# Patient Record
Sex: MALE | Race: WHITE | NOT HISPANIC OR LATINO | Employment: OTHER | URBAN - NONMETROPOLITAN AREA
[De-identification: names, ages, dates, MRNs, and addresses within clinical notes are randomized per-mention and may not be internally consistent; named-entity substitution may affect disease eponyms.]

---

## 2017-06-21 ENCOUNTER — HOSPITAL ENCOUNTER (OUTPATIENT)
Dept: CT IMAGING | Facility: HOSPITAL | Age: 67
Discharge: HOME | End: 2017-06-21
Attending: PEDIATRICS

## 2017-06-21 ENCOUNTER — HOSPITAL ENCOUNTER (OUTPATIENT)
Dept: CT IMAGING | Facility: HOSPITAL | Age: 67
End: 2017-06-21
Attending: PEDIATRICS

## 2017-06-21 ENCOUNTER — HOSPITAL ENCOUNTER (OUTPATIENT)
Dept: CT IMAGING | Facility: HOSPITAL | Age: 67
Discharge: HOME | End: 2017-06-21
Attending: PEDIATRICS | Admitting: PEDIATRICS

## 2017-06-21 ENCOUNTER — TRANSCRIBE ORDERS (OUTPATIENT)
Dept: LAB | Facility: HOSPITAL | Age: 67
End: 2017-06-21

## 2017-06-21 DIAGNOSIS — Z85.9 HISTORY OF CANCER: Primary | ICD-10-CM

## 2017-06-21 DIAGNOSIS — Z85.9 HISTORY OF CANCER: ICD-10-CM

## 2017-06-21 PROCEDURE — 70490 CT SOFT TISSUE NECK W/O DYE: CPT

## 2017-06-21 PROCEDURE — 71250 CT THORAX DX C-: CPT

## 2017-06-21 PROCEDURE — 74176 CT ABD & PELVIS W/O CONTRAST: CPT

## 2017-06-21 PROCEDURE — 70450 CT HEAD/BRAIN W/O DYE: CPT

## 2017-06-22 ENCOUNTER — APPOINTMENT (OUTPATIENT)
Dept: CT IMAGING | Facility: HOSPITAL | Age: 67
End: 2017-06-22
Attending: PEDIATRICS

## 2017-06-22 ENCOUNTER — APPOINTMENT (OUTPATIENT)
Dept: GENERAL RADIOLOGY | Facility: HOSPITAL | Age: 67
End: 2017-06-22
Attending: PEDIATRICS

## 2018-09-11 ENCOUNTER — HOSPITAL ENCOUNTER (OUTPATIENT)
Dept: CT IMAGING | Facility: HOSPITAL | Age: 68
Discharge: HOME | End: 2018-09-11
Attending: PEDIATRICS | Admitting: PEDIATRICS

## 2018-09-11 ENCOUNTER — TRANSCRIBE ORDERS (OUTPATIENT)
Dept: ADMINISTRATIVE | Facility: HOSPITAL | Age: 68
End: 2018-09-11

## 2018-09-11 ENCOUNTER — LAB (OUTPATIENT)
Dept: LAB | Facility: HOSPITAL | Age: 68
End: 2018-09-11
Attending: PEDIATRICS

## 2018-09-11 DIAGNOSIS — E55.9 VITAMIN D DEFICIENCY: ICD-10-CM

## 2018-09-11 DIAGNOSIS — Z00.00 ROUTINE GENERAL MEDICAL EXAMINATION AT A HEALTH CARE FACILITY: ICD-10-CM

## 2018-09-11 DIAGNOSIS — Z00.00 ROUTINE GENERAL MEDICAL EXAMINATION AT A HEALTH CARE FACILITY: Primary | ICD-10-CM

## 2018-09-11 DIAGNOSIS — Z85.9 HISTORY OF MALIGNANT NEOPLASM: ICD-10-CM

## 2018-09-11 DIAGNOSIS — Z12.5 SPECIAL SCREENING FOR MALIGNANT NEOPLASM OF PROSTATE: ICD-10-CM

## 2018-09-11 LAB
25(OH)D3 SERPL-MCNC: 61.5 NG/ML (ref 30–100)
ALBUMIN SERPL-MCNC: 4.2 G/DL (ref 3.5–5)
ALBUMIN/GLOB SERPL: 1.4 G/DL (ref 1.1–2.5)
ALP SERPL-CCNC: 84 U/L (ref 24–120)
ALT SERPL W P-5'-P-CCNC: 28 U/L (ref 0–54)
ANION GAP SERPL CALCULATED.3IONS-SCNC: 6 MMOL/L (ref 4–13)
AST SERPL-CCNC: 25 U/L (ref 7–45)
AUTO MIXED CELLS #: 0.7 10*3/MM3 (ref 0.1–2.6)
AUTO MIXED CELLS %: 6.1 % (ref 0.1–24)
BILIRUB SERPL-MCNC: 0.6 MG/DL (ref 0.1–1)
BILIRUB UR QL STRIP: NEGATIVE
BUN BLD-MCNC: 8 MG/DL (ref 5–21)
BUN/CREAT SERPL: 8.9
CALCIUM SPEC-SCNC: 9.2 MG/DL (ref 8.4–10.4)
CHLORIDE SERPL-SCNC: 107 MMOL/L (ref 98–110)
CHOLEST SERPL-MCNC: 196 MG/DL (ref 130–200)
CLARITY UR: CLEAR
CO2 SERPL-SCNC: 31 MMOL/L (ref 24–31)
COLOR UR: YELLOW
CREAT BLD-MCNC: 0.9 MG/DL (ref 0.5–1.4)
ERYTHROCYTE [DISTWIDTH] IN BLOOD BY AUTOMATED COUNT: 13.2 % (ref 12–15)
GFR SERPL CREATININE-BSD FRML MDRD: 84 ML/MIN/1.73
GLOBULIN UR ELPH-MCNC: 3.1 GM/DL
GLUCOSE BLD-MCNC: 108 MG/DL (ref 70–100)
GLUCOSE UR STRIP-MCNC: NEGATIVE MG/DL
HCT VFR BLD AUTO: 45.3 % (ref 40–52)
HDLC SERPL-MCNC: 59 MG/DL
HGB BLD-MCNC: 15.2 G/DL (ref 14–18)
HGB UR QL STRIP.AUTO: NEGATIVE
KETONES UR QL STRIP: NEGATIVE
LDLC SERPL CALC-MCNC: 123 MG/DL (ref 0–99)
LDLC/HDLC SERPL: 2.09 {RATIO}
LEUKOCYTE ESTERASE UR QL STRIP.AUTO: NEGATIVE
LYMPHOCYTES # BLD AUTO: 3.2 10*3/MM3 (ref 0.8–7)
LYMPHOCYTES NFR BLD AUTO: 29.8 % (ref 15–45)
MCH RBC QN AUTO: 31.8 PG (ref 28–32)
MCHC RBC AUTO-ENTMCNC: 33.6 G/DL (ref 33–36)
MCV RBC AUTO: 94.8 FL (ref 82–95)
NEUTROPHILS # BLD AUTO: 6.8 10*3/MM3 (ref 1.5–8.3)
NEUTROPHILS NFR BLD AUTO: 64.1 % (ref 39–78)
NITRITE UR QL STRIP: NEGATIVE
PH UR STRIP.AUTO: 7.5 [PH] (ref 5–8)
PLATELET # BLD AUTO: 190 10*3/MM3 (ref 130–400)
PMV BLD AUTO: 9.7 FL (ref 6–12)
POTASSIUM BLD-SCNC: 4.7 MMOL/L (ref 3.5–5.3)
PROT SERPL-MCNC: 7.3 G/DL (ref 6.3–8.7)
PROT UR QL STRIP: NEGATIVE
PSA SERPL-MCNC: 1.52 NG/ML (ref 0–4)
RBC # BLD AUTO: 4.78 10*6/MM3 (ref 4.2–5.4)
SODIUM BLD-SCNC: 144 MMOL/L (ref 135–145)
SP GR UR STRIP: 1.01 (ref 1–1.03)
TRIGL SERPL-MCNC: 69 MG/DL (ref 0–149)
UROBILINOGEN UR QL STRIP: NORMAL
VLDLC SERPL-MCNC: 13.8 MG/DL
WBC NRBC COR # BLD: 10.7 10*3/MM3 (ref 4.8–10.8)

## 2018-09-11 PROCEDURE — 70450 CT HEAD/BRAIN W/O DYE: CPT

## 2018-09-11 PROCEDURE — 36415 COLL VENOUS BLD VENIPUNCTURE: CPT

## 2018-09-11 PROCEDURE — 81003 URINALYSIS AUTO W/O SCOPE: CPT

## 2018-09-11 PROCEDURE — 74176 CT ABD & PELVIS W/O CONTRAST: CPT

## 2018-09-11 PROCEDURE — 85025 COMPLETE CBC W/AUTO DIFF WBC: CPT | Performed by: PEDIATRICS

## 2018-09-11 PROCEDURE — 70490 CT SOFT TISSUE NECK W/O DYE: CPT

## 2018-09-11 PROCEDURE — 80061 LIPID PANEL: CPT

## 2018-09-11 PROCEDURE — G0103 PSA SCREENING: HCPCS | Performed by: PEDIATRICS

## 2018-09-11 PROCEDURE — 80053 COMPREHEN METABOLIC PANEL: CPT | Performed by: PEDIATRICS

## 2018-09-11 PROCEDURE — 71250 CT THORAX DX C-: CPT

## 2018-09-11 PROCEDURE — 82306 VITAMIN D 25 HYDROXY: CPT | Performed by: PEDIATRICS

## 2018-09-17 NOTE — PROGRESS NOTES
Hector Cyr MD     Chief Complaint   Patient presents with   • tongue mass       HPI   Julio Abdullahi is a  68 y.o.  male who presents for evaluation of an abnormal radiographic study. He had a recent CT  of the neck that showed base of tongue irregularity and shoddy adenopathy.[facility] scanning was obtained for evaluation of a history of melanoma.  He has had no complaints related to the findings. He denies neither otalgia nor throat pain, feeling of something in the throat, voice change or trouble swallowing.    Review of Systems:  Reviewed per patient intake note    Past History:  History reviewed. No pertinent past medical history.  History reviewed. No pertinent surgical history.  History reviewed. No pertinent family history.  Social History   Substance Use Topics   • Smoking status: Never Smoker   • Smokeless tobacco: Never Used   • Alcohol use No     Outpatient Prescriptions Marked as Taking for the 9/19/18 encounter (Office Visit) with Hector Cyr MD   Medication Sig Dispense Refill   • Multiple Vitamin (MULTI-VITAMIN DAILY PO) Take  by mouth.       Allergies:  Patient has no known allergies.    Vital Signs:   Temp:  [97.7 °F (36.5 °C)] 97.7 °F (36.5 °C)  BP: (132)/(88) 132/88    Physical Exam   CONSTITUTIONAL: well nourished, well-developed, alert, oriented, in no acute distress   COMMUNICATION AND VOICE: able to communicate normally, normal voice quality  HEAD: normocephalic, no lesions, atraumatic, no tenderness, no masses   FACE: appearance normal, no lesions, no tenderness, no deformities, facial motion symmetric  SALIVARY GLANDS: parotid glands with no tenderness, no swelling, no masses, submandibular glands with normal size, nontender  EYES: ocular motility normal, eyelids normal, orbits normal, no proptosis, conjunctiva normal , pupils equal, round  HEARING: response to conversational voice normal bilaterally    EXTERNAL EARS: auricles without lesions  EXTERNAL EAR CANALS: normal ear canals without stenosis or significant cerumen  TYMPANIC MEMBRANES: tympanic membrane appearance normal, no lesions, no perforation, normal mobility, no fluid  EXTERNAL NOSE: structure normal, no tenderness on palpation, no nasal discharge, no lesions, no evidence of trauma, nostrils patent  INTRANASAL EXAM: nasal mucosa normal, vestibule within normal limits, inferior turbinate normal,  nasal septum without overt anterior deviation  NASOPHARYNX: nasopharyngeal mucosa, adenoids within normal limits  LIPS: structure normal, no tenderness on palpation, no lesions, no evidence of trauma  TEETH: dentition within normal limits for age  GUMS: gingivae healthy  ORAL MUCOSA: oral mucosa normal, no mucosal lesions  FLOOR OF MOUTH: Warthin's duct patent, mucosa normal  TONGUE: mild to moderate tongue hypertrophy present, soft to palpation  PALATE: soft and hard palates with normal mucosa and structure  OROPHARYNX: oropharyngeal mucosa normal, tonsil fossa normal in appearance  HYPOPHARYNX: hypopharyngeal mucosa normal  LARYNX: epiglottis and arytenoid cartilage within normal limits, vocal cord mucosa normal with normal mobility   NECK: neck appearance normal, no masses or tenderness  THYROID: no overt thyromegaly, no tenderness, nodules or mass present on palpation, position midline   LYMPH NODES: no lymphadenopathy  CHEST/RESPIRATORY: respiratory effort normal, normal breath sounds  CARDIOVASCULAR: rate and rhythm normal, extremities without cyanosis or edema, no overt jugulovenous distension present  NEUROLOGIC/PSYCHIATRIC: oriented appropriately for age, mood normal, affect appropriate, cranial nerves intact grossly unless specifically mentioned above     RESULTS REVIEW:    I have personally reviewed the patient's ct scan of the neck without contrast images.           Ct Soft Tissue Neck Without Contrast    Result Date: 9/11/2018  Narrative:  EXAMINATION: CT soft tissues neck without contrast 9/11/2018  HISTORY: HISTORY of malignant neoplasm  DOSE: 225 mGycm. Automated exposure control was utilized to diminish patient radiation dose.  FINDINGS: Multiple contiguous axial images are obtained through the soft tissues of the neck at 2 mm intervals without intravenous contrast administration with reformatted images obtained in the sagittal and coronal projections from the original data set. Those portions of the brain which are visualized are normal in appearance.  The orbits are normal in appearance. There is mild mucoperiosteal thickening of the sphenoid sinus. No air-fluid levels are present.  The nasopharynx and parapharyngeal spaces are symmetric with no evidence of mass effect or mass lesion. Both parotid glands as well as the submandibular glands are normal in appearance. As noted on the previous examination there is a borderline enlarged 10 mm level 2 node in the proximal left jugular chain. Some other smaller level 2 and 3 nodes are present on the left. Some small level 2 and level 3 nodes are also noted within the right jugular chain none of which are enlarged by CT criteria.   At the level of the base of the tongue there is noted to be an abnormal lobular soft tissue density. This measures approximately 2.4 cm in transverse dimension by 1.3 cm in anterior to posterior dimension and is concerning for a potential neoplasm involving the base of the tongue. The epiglottis is also nodular in appearance to the left of midline and there is also some abnormal soft tissue nodularity within the inferior aspect of the left vallecula. If not previously evaluated follow-up with ENT consultation and direct visualization of this area is recommended. The piriform sinuses are symmetric.  At the level of the true cords there is asymmetry of the right vocal fold which is adducted. I do not see evidence of a discrete mass at the level of the true cords. Subglottic  airway is unremarkable.  The thyroid gland is homogeneous in density. No evidence of enlarged lymphadenopathy within the supraclavicular fossa.  The superior mediastinum is unremarkable. The lung apices are clear. No acute bony abnormalities are present.      Impression: 1.. There is a abnormal lobular mass at the base of the tongue with measurements as above. There is also some asymmetric nodular thickening involving the left half of the epiglottis as well as some asymmetry along the inferior margin of the left vallecula. ENT consultation with direct visualization of this area is recommended. A neoplasm involving the base of the tongue is not excluded. 2. There is adduction of the right vocal fold. A discrete mass is not identified at the level of the cords. 3. Borderline enlarged level 2 node on the left measuring 10 mm in short axis. Some other smaller shotty nodes are noted within the jugular chains bilaterally including level 2 and 3 nodes. This report was finalized on 09/11/2018 13:04 by Dr. Salvador Vazquez MD.    Ct Chest Without Contrast    Result Date: 9/11/2018  Narrative: CT CHEST without contrast dated 9/11/2018 10:26 AM CDT  HISTORY: HISTORY OF MALIGNANT NEOPLASM; Z85.9-Personal history of malignant neoplasm, unspecified  COMPARISON: 6/21/2017  DLP: 530 mGy cm. Automated exposure control was utilized to diminish patient radiation dose.  TECHNIQUE: Serial helical tomographic images of the chest were acquired. Bone and soft tissue algorithms were provided. Coronal reformatted images were also provided for review.  FINDINGS: The imaged portion of the neck and thyroid gland is unremarkable.  There is scarring within the lung apices. There is linear scarring within the right lower lobe and left basilar subsegmental atelectasis. No discrete lung lesion is demonstrated. No convincing evidence of metastatic disease to the lungs.. No pleural effusion is present. The trachea and bronchial tree are patent.  The  heart, great vessels, and pulmonary vessels are normal in appearance within limits of a noncontrast study. There is no pericardial effusion. No enlarged axillary adenopathy is present. There is a borderline-enlarged lateral AP window node measuring 9 mm in short axis but unchanged from the previous study. Several small shotty pretracheal, right paratracheal and AP window nodes are present none of which are enlarged by CT criteria and which are stable. Calcified subcarinal and left perihilar nodes are present related to old healed granulomatous disease..  The osseous structures of the thorax and surrounding soft tissues are unremarkable.  The imaged portion of the upper abdomen is unremarkable.      Impression: 1. Stable appearance of the chest from previous study of 6/21/2017. No radiographic evidence of metastatic disease. 2. Linear scarring within the right lung base.. 3. Stable lymph nodes within the middle mediastinum.   This report was finalized on 09/11/2018 13:16 by Dr. Salvador Vazquez MD.       Assessment   1. Abnormal CT scan, neck    2. Lingual tonsil hypertrophy        Plan    Conservative management.  Be aware of the signs and symptoms of head and neck cancer including neck mass, persistent sore throat, ear pain, hemoptysis, weight loss and hoarseness. If any of these symptoms occur, call for evaluation.     Return in about 6 months (around 3/19/2019).    Hector Cyr MD  09/19/18  9:14 AM

## 2018-09-19 ENCOUNTER — OFFICE VISIT (OUTPATIENT)
Dept: OTOLARYNGOLOGY | Facility: CLINIC | Age: 68
End: 2018-09-19

## 2018-09-19 VITALS
DIASTOLIC BLOOD PRESSURE: 88 MMHG | HEIGHT: 71 IN | SYSTOLIC BLOOD PRESSURE: 132 MMHG | BODY MASS INDEX: 21.76 KG/M2 | WEIGHT: 155.4 LBS | TEMPERATURE: 97.7 F

## 2018-09-19 DIAGNOSIS — R93.89 ABNORMAL CT SCAN, NECK: Primary | ICD-10-CM

## 2018-09-19 DIAGNOSIS — J35.1 LINGUAL TONSIL HYPERTROPHY: ICD-10-CM

## 2018-09-19 PROCEDURE — 99202 OFFICE O/P NEW SF 15 MIN: CPT | Performed by: OTOLARYNGOLOGY

## 2018-09-19 NOTE — PROGRESS NOTES
Sydnee Mccarty MA   Patient Intake Note    Review of Systems  Review of Systems   Constitutional: Negative for chills, fatigue and fever.   HENT:        See hpi   Respiratory: Negative for cough, choking, shortness of breath and wheezing.    Gastrointestinal: Negative for diarrhea, nausea and vomiting.   Allergic/Immunologic: Positive for environmental allergies. Negative for food allergies.   Neurological: Positive for dizziness (after allergy attack). Negative for light-headedness and headaches.   Hematological: Does not bruise/bleed easily.   Psychiatric/Behavioral: Negative for sleep disturbance.   All other systems reviewed and are negative.      QUALITY MEASURES    Body Mass Index Screening and Follow-Up Plan  Body mass index is 21.67 kg/m².  Patient's Body mass index is 21.67 kg/m². BMI is below normal parameters. Recommendations include: referral to primary care.    Tobacco Use: Screening and Cessation Intervention  Smoking status: Never Smoker                                                              Smokeless tobacco: Never Used                            Sydnee Mccarty MA  9/19/2018  8:57 AM

## 2019-05-31 ENCOUNTER — OFFICE VISIT (OUTPATIENT)
Dept: OTOLARYNGOLOGY | Facility: CLINIC | Age: 69
End: 2019-05-31

## 2019-05-31 ENCOUNTER — PROCEDURE VISIT (OUTPATIENT)
Dept: OTOLARYNGOLOGY | Facility: CLINIC | Age: 69
End: 2019-05-31

## 2019-05-31 VITALS
RESPIRATION RATE: 20 BRPM | HEART RATE: 65 BPM | BODY MASS INDEX: 21.84 KG/M2 | TEMPERATURE: 98 F | DIASTOLIC BLOOD PRESSURE: 79 MMHG | SYSTOLIC BLOOD PRESSURE: 135 MMHG | WEIGHT: 156 LBS | HEIGHT: 71 IN

## 2019-05-31 DIAGNOSIS — H91.90 HEARING LOSS, UNSPECIFIED HEARING LOSS TYPE, UNSPECIFIED LATERALITY: Primary | ICD-10-CM

## 2019-05-31 DIAGNOSIS — H90.5 SENSORINEURAL HEARING LOSS (SNHL) OF RIGHT EAR, UNSPECIFIED HEARING STATUS ON CONTRALATERAL SIDE: ICD-10-CM

## 2019-05-31 DIAGNOSIS — H90.5 SENSORINEURAL HEARING LOSS (SNHL) OF RIGHT EAR, UNSPECIFIED HEARING STATUS ON CONTRALATERAL SIDE: Primary | ICD-10-CM

## 2019-05-31 DIAGNOSIS — H91.20 SUDDEN HEARING LOSS, UNSPECIFIED LATERALITY: ICD-10-CM

## 2019-05-31 DIAGNOSIS — Z01.818 ENCOUNTER FOR OTHER PREPROCEDURAL EXAMINATION: ICD-10-CM

## 2019-05-31 PROCEDURE — 99213 OFFICE O/P EST LOW 20 MIN: CPT | Performed by: OTOLARYNGOLOGY

## 2019-05-31 PROCEDURE — 92570 ACOUSTIC IMMITANCE TESTING: CPT | Performed by: AUDIOLOGIST-HEARING AID FITTER

## 2019-05-31 PROCEDURE — 92557 COMPREHENSIVE HEARING TEST: CPT | Performed by: AUDIOLOGIST-HEARING AID FITTER

## 2019-05-31 RX ORDER — PREDNISONE 10 MG/1
TABLET ORAL
Qty: 42 TABLET | Refills: 0 | Status: SHIPPED | OUTPATIENT
Start: 2019-05-31 | End: 2019-07-26

## 2019-05-31 RX ORDER — BUTALBITAL, ASPIRIN, AND CAFFEINE 325; 50; 40 MG/1; MG/1; MG/1
CAPSULE ORAL
Refills: 0 | COMMUNITY
Start: 2019-05-13

## 2019-05-31 RX ORDER — DIAZEPAM 10 MG/1
TABLET ORAL
Refills: 0 | COMMUNITY
Start: 2019-05-13 | End: 2019-06-12 | Stop reason: SDUPTHER

## 2019-05-31 RX ORDER — TRIAMTERENE AND HYDROCHLOROTHIAZIDE 37.5; 25 MG/1; MG/1
1 CAPSULE ORAL EVERY MORNING
Qty: 30 CAPSULE | Refills: 3 | Status: SHIPPED | OUTPATIENT
Start: 2019-05-31 | End: 2019-06-30

## 2019-05-31 NOTE — PROGRESS NOTES
Hector Cyr MD     Chief Complaint   Patient presents with   • Follow-up     Hearing Loss. patient having Right ear fullness and hearing loss.        History of Present Illness  Julio Abdullahi is a  69 y.o. male who is here for follow up. He has had a recent flair up of ear fullness, hearing loss and roaring tinnitus. The symptoms are localized to the right side. The symptoms severity was described as: moderate The symptoms have been: relatively constant for the last several days There have been no identified factors that aggravate the symptoms. There have been no factors that have improved the symptoms. He denies  Vertigo but has had some light vertigo symptoms in the past. It usually is improved with a cocktail of dizzy medications from Dr Lucia. He was given a steroid shot and a medrol dose pack at the [facility] yesterday.    Review of Systems  Reviewed per patient intake note    Past History:  Past medical and surgical history, family history and social history reviewed and updated when appropriate.  Current medications and allergies reviewed and updated when appropriate.  Allergies:  Patient has no known allergies.        Vital Signs:   Temp:  [98 °F (36.7 °C)] 98 °F (36.7 °C)  Heart Rate:  [65] 65  Resp:  [20] 20  BP: (135)/(79) 135/79    Physical Exam:  CONSTITUTIONAL: well nourished, well-developed, alert, oriented, in no acute distress   COMMUNICATION AND VOICE: able to communicate normally, normal voice quality  HEAD: normocephalic, no lesions, atraumatic, no tenderness, no masses   FACE: appearance normal, no lesions, no tenderness, no deformities, facial motion symmetric  EYES: ocular motility normal, eyelids normal, orbits normal, no proptosis, conjunctiva normal , pupils equal, round  HEARING: response to conversational voice normal bilaterally   EXTERNAL EARS: auricles without lesions  EXTERNAL EAR CANALS: normal ear canals without stenosis or significant cerumen  TYMPANIC MEMBRANES:  tympanic membrane appearance normal, no lesions, no perforation, normal mobility, no fluid  EXTERNAL NOSE: structure normal, no tenderness on palpation, no nasal discharge, no lesions, no evidence of trauma, nostrils patent  LIPS: structure normal, no tenderness on palpation, no lesions, no evidence of trauma  NECK: neck appearance normal  LYMPH NODES: no lymphadenopathy  CHEST/RESPIRATORY: respiratory effort normal  CARDIOVASCULAR: extremities without cyanosis or edema, no overt jugulovenous distension present  NEUROLOGIC/PSYCHIATRIC: oriented appropriately for age, mood normal, affect appropriate, cranial nerves intact grossly unless specifically mentioned above     RESULTS REVIEW:    Pure tone testing showed moderate right low frequency up sloping sensorineural hearing loss     Assessment   1. Hearing loss, unspecified hearing loss type, unspecified laterality    2. Sudden hearing loss, unspecified laterality    3. Encounter for other preprocedural examination     4. Sensorineural hearing loss (SNHL) of right ear, unspecified hearing status on contralateral side        Plan    Medical and surgical options were discussed including oral steroids and steroid perfusion. Risks, benefits and alternatives were discussed and questions were answered. After considering the options, the patient decided to proceed with a higher oral steroid course with injection if not improving..  New Medications Ordered This Visit   Medications   • predniSONE (DELTASONE) 10 MG tablet     Si tabs daily x 2 days, 5 tabs daily x 2 days, 4 tabs x 2 days, 3 tablets x 2 days, 2 tablets x 2 days, and 1 tab x 2 days.     Dispense:  42 tablet     Refill:  0   • triamterene-hydrochlorothiazide (DYAZIDE) 37.5-25 MG per capsule     Sig: Take 1 capsule by mouth Every Morning for 30 days.     Dispense:  30 capsule     Refill:  3          Return in about 2 weeks (around 2019). If not improving, will consider transtympanic steroid  perfusion.    Hector Cyr MD  05/31/19  3:30 PM

## 2019-06-12 RX ORDER — MECLIZINE HYDROCHLORIDE 25 MG/1
25 TABLET ORAL 3 TIMES DAILY PRN
Qty: 60 TABLET | Refills: 3 | Status: SHIPPED | OUTPATIENT
Start: 2019-06-12 | End: 2019-07-12

## 2019-06-12 RX ORDER — DIAZEPAM 10 MG/1
10 TABLET ORAL DAILY PRN
Qty: 30 TABLET | Refills: 0 | Status: SHIPPED | OUTPATIENT
Start: 2019-06-12 | End: 2019-07-12

## 2019-06-20 NOTE — PROGRESS NOTES
Hector Cyr MD     Chief Complaint   Patient presents with   • Hearing Loss        History of Present Illness  Julio Abdullahi is a  69 y.o. male who is here for follow up. He was last seen in the office on 6/12/2019 by: myself with: A sudden right-sided sensorineural hearing loss.. The patient was treated with: steroids and Dyazide.  He has had some improvement but still with right sided decreased hearing. He desires steroid perfusion.    Review of Systems  Reviewed per patient intake note    Past History:  Past medical and surgical history, family history and social history reviewed and updated when appropriate.  Current medications and allergies reviewed and updated when appropriate.  Allergies:  Patient has no known allergies.        Vital Signs:   Temp:  [97.4 °F (36.3 °C)] 97.4 °F (36.3 °C)  Heart Rate:  [72] 72  Resp:  [16] 16  BP: (124)/(72) 124/72    Physical Exam   CONSTITUTIONAL: well nourished, well-developed, alert, oriented, in no acute distress   COMMUNICATION AND VOICE: able to communicate normally, normal voice quality  HEAD: normocephalic, no lesions, atraumatic, no tenderness, no masses   FACE: appearance normal, no lesions, no tenderness, no deformities, facial motion symmetric  EYES: ocular motility normal, eyelids normal, orbits normal, no proptosis, conjunctiva normal , pupils equal, round  HEARING: response to conversational voice normal bilaterally   EXTERNAL EARS: auricles without lesions  EXTERNAL EAR CANALS: normal ear canals without stenosis or significant cerumen  TYMPANIC MEMBRANES: tympanic membrane appearance normal, no lesions, no perforation, normal mobility, no fluid  EXTERNAL NOSE: structure normal, no tenderness on palpation, no nasal discharge, no lesions, no evidence of trauma, nostrils patent  LIPS: structure normal, no tenderness on palpation, no lesions, no evidence of trauma  NECK: neck appearance normal  LYMPH NODES: no lymphadenopathy  CHEST/RESPIRATORY:  respiratory effort normal  CARDIOVASCULAR: extremities without cyanosis or edema, no overt jugulovenous distension present  NEUROLOGIC/PSYCHIATRIC: oriented appropriately for age, mood normal, affect appropriate, cranial nerves intact grossly unless specifically mentioned above     RESULTS REVIEW:    Audiologic testing reviewed.  New audiogram deferred by patient till after perfusion     Assessment   1. Sensorineural hearing loss (SNHL) of right ear, unspecified hearing status on contralateral side        Plan    Medical and surgical options were discussed including observation and medication modification and steroid perfusion. Risks, benefits and alternatives were discussed and questions were answered. After considering the options, the patient decided to proceed with steroid perfusion.             Procedure Note    Pre-operative Diagnosis:   1. Sensorineural hearing loss (SNHL) of right ear, unspecified hearing status on contralateral side        Post-operative Diagnosis: same    Anesthesia: topical phenol    Procedure:   right dexamethasone perfusion of the middle ear space    Procedure Details:    After informed consent, the patient was placed on the procedure table in a supine position. Using the operative microscope and a speculum the external auditory canal was visualized and cleaned of its cerumen.The tympanic membrane was visualized. The anterior mid tympanic membrane was anesthetized with a drop of phenol. Then a 25 gauge gauge spinal needle was used to inject approximately 1 cc of dexamethasone, previously compounded by pharmacy using the [place] protocol. The patient was placed in a position with the injected ear up to layer the steroid onto the round window for 15-20 minutes.     Findings: normal eardrum and middle ear    Condition:  Stable.  Patient tolerated procedure well.    Complications:  None    Hector Cyr MD  6/21/2019  11:05 AM  Return in about 10 days (around 7/1/2019) for for  repeat steroid perfusion in 10-14 days.    Hector Cyr MD  06/21/19  11:05 AM

## 2019-06-21 ENCOUNTER — OFFICE VISIT (OUTPATIENT)
Dept: OTOLARYNGOLOGY | Facility: CLINIC | Age: 69
End: 2019-06-21

## 2019-06-21 VITALS
RESPIRATION RATE: 16 BRPM | HEART RATE: 72 BPM | WEIGHT: 160 LBS | DIASTOLIC BLOOD PRESSURE: 72 MMHG | TEMPERATURE: 97.4 F | HEIGHT: 71 IN | SYSTOLIC BLOOD PRESSURE: 124 MMHG | BODY MASS INDEX: 22.4 KG/M2

## 2019-06-21 DIAGNOSIS — H91.20 SUDDEN HEARING LOSS, UNSPECIFIED LATERALITY: ICD-10-CM

## 2019-06-21 DIAGNOSIS — H90.5 SENSORINEURAL HEARING LOSS (SNHL) OF RIGHT EAR, UNSPECIFIED HEARING STATUS ON CONTRALATERAL SIDE: Primary | ICD-10-CM

## 2019-06-21 PROCEDURE — 69801 INCISE INNER EAR: CPT | Performed by: OTOLARYNGOLOGY

## 2019-06-21 PROCEDURE — 99213 OFFICE O/P EST LOW 20 MIN: CPT | Performed by: OTOLARYNGOLOGY

## 2019-06-21 RX ORDER — ASPIRIN 81 MG/1
81 TABLET, CHEWABLE ORAL DAILY
COMMUNITY

## 2019-06-21 NOTE — PROGRESS NOTES
Temi Shaw RN   Patient Intake Note    Review of Systems  Review of Systems   Constitutional: Negative.    HENT: Positive for hearing loss and tinnitus.    Eyes: Negative.    Respiratory: Negative.    Cardiovascular: Negative.    Gastrointestinal: Negative.    Endocrine: Negative.    Genitourinary: Negative.    Musculoskeletal: Negative.    Skin: Negative.    Allergic/Immunologic: Positive for environmental allergies.   Neurological: Negative.    Hematological: Negative.    Psychiatric/Behavioral: Negative.        QUALITY MEASURES    Tobacco Use: Screening and Cessation Intervention  Social History    Tobacco Use      Smoking status: Former Smoker        Types: Cigarettes      Smokeless tobacco: Never Used        Temi Shaw RN  6/21/2019  10:14 AM

## 2019-07-01 ENCOUNTER — OFFICE VISIT (OUTPATIENT)
Dept: OTOLARYNGOLOGY | Facility: CLINIC | Age: 69
End: 2019-07-01

## 2019-07-01 DIAGNOSIS — H91.21 SUDDEN RIGHT HEARING LOSS: Primary | ICD-10-CM

## 2019-07-01 PROCEDURE — 69801 INCISE INNER EAR: CPT | Performed by: OTOLARYNGOLOGY

## 2019-07-08 ENCOUNTER — HOSPITAL ENCOUNTER (OUTPATIENT)
Dept: MRI IMAGING | Facility: HOSPITAL | Age: 69
Discharge: HOME | End: 2019-07-08
Admitting: OTOLARYNGOLOGY

## 2019-07-08 DIAGNOSIS — H91.20 SUDDEN HEARING LOSS, UNSPECIFIED LATERALITY: ICD-10-CM

## 2019-07-08 DIAGNOSIS — H90.5 SENSORINEURAL HEARING LOSS (SNHL) OF RIGHT EAR, UNSPECIFIED HEARING STATUS ON CONTRALATERAL SIDE: ICD-10-CM

## 2019-07-08 LAB — CREAT BLDA-MCNC: 1 MG/DL (ref 0.6–1.3)

## 2019-07-08 PROCEDURE — 0 GADOBENATE DIMEGLUMINE 529 MG/ML SOLUTION: Performed by: OTOLARYNGOLOGY

## 2019-07-08 PROCEDURE — 70553 MRI BRAIN STEM W/O & W/DYE: CPT

## 2019-07-08 PROCEDURE — 82565 ASSAY OF CREATININE: CPT

## 2019-07-08 PROCEDURE — A9577 INJ MULTIHANCE: HCPCS | Performed by: OTOLARYNGOLOGY

## 2019-07-08 RX ADMIN — GADOBENATE DIMEGLUMINE 15 ML: 529 INJECTION, SOLUTION INTRAVENOUS at 10:12

## 2019-07-24 NOTE — PROGRESS NOTES
Hector Cyr MD     Chief Complaint   Patient presents with   • Hearing Loss     improved   • Med Refill     Needs refills of Valium, Diazide and Antivert        History of Present Illness  Julio Abdullahi is a  69 y.o. male who is here for follow up.  He reports he has had improvement of his hearing on the right side but still has decreased hearing from prior to his event.  He has had some difficulty with imbalance since the acute drop.  He is using Dyazide and as needed Valium and Antivert.  He has been trying to keep a low-salt diet.    Review of Systems  Reviewed per patient intake note    Past History:  Past medical and surgical history, family history and social history reviewed and updated when appropriate.  Current medications and allergies reviewed and updated when appropriate.  Allergies:  Patient has no known allergies.        Vital Signs:   Temp:  [98.7 °F (37.1 °C)] 98.7 °F (37.1 °C)  Heart Rate:  [59] 59  BP: (120)/(70) 120/70    Physical Exam:  CONSTITUTIONAL: well nourished, well-developed, alert, oriented, in no acute distress   COMMUNICATION AND VOICE: able to communicate normally, normal voice quality  HEAD: normocephalic, no lesions, atraumatic, no tenderness, no masses   FACE: appearance normal, no lesions, no tenderness, no deformities, facial motion symmetric  EYES: ocular motility normal, eyelids normal, orbits normal, no proptosis, conjunctiva normal , pupils equal, round  HEARING: response to conversational voice normal bilaterally   EXTERNAL EARS: auricles without lesions  EXTERNAL EAR CANALS: normal ear canals without stenosis or significant cerumen  TYMPANIC MEMBRANES: tympanic membrane appearance normal, no lesions, no perforation, normal mobility, no fluid  EXTERNAL NOSE: structure normal, no tenderness on palpation, no nasal discharge, no lesions, no evidence of trauma, nostrils patent  LIPS: structure normal, no tenderness on palpation, no lesions, no evidence of  trauma  NECK: neck appearance normal  LYMPH NODES: no lymphadenopathy  CHEST/RESPIRATORY: respiratory effort normal  CARDIOVASCULAR: extremities without cyanosis or edema, no overt jugulovenous distension present  NEUROLOGIC/PSYCHIATRIC: oriented appropriately for age, mood normal, affect appropriate, cranial nerves intact grossly unless specifically mentioned above     RESULTS REVIEW:    His MRI showed a parafalcine meningioma.  There was no internal auditory canal mass.     Assessment   1. Sudden right hearing loss    2. Endolymphatic hydrops of right ear        Plan    Continue current management plan.  Follow a low salt diet to try to prevent inner ear fluid accumulation.  Follow the instructions for [place] exersises.  New Medications Ordered This Visit   Medications   • glycopyrrolate (ROBINUL) 1 MG tablet     Sig: Take 1 tablet by mouth 3 (Three) Times a Day As Needed (vertigo) for up to 30 days. Take one pill three times a day as needed for vertigo     Dispense:  60 tablet     Refill:  3          Return in about 4 months (around 11/26/2019).    Hector Cyr MD  07/26/19  11:01 AM

## 2019-07-26 ENCOUNTER — PROCEDURE VISIT (OUTPATIENT)
Dept: OTOLARYNGOLOGY | Facility: CLINIC | Age: 69
End: 2019-07-26

## 2019-07-26 ENCOUNTER — OFFICE VISIT (OUTPATIENT)
Dept: OTOLARYNGOLOGY | Facility: CLINIC | Age: 69
End: 2019-07-26

## 2019-07-26 VITALS
WEIGHT: 160 LBS | BODY MASS INDEX: 22.9 KG/M2 | HEIGHT: 70 IN | TEMPERATURE: 98.7 F | SYSTOLIC BLOOD PRESSURE: 120 MMHG | HEART RATE: 59 BPM | DIASTOLIC BLOOD PRESSURE: 70 MMHG

## 2019-07-26 DIAGNOSIS — H90.3 SNHL (SENSORY-NEURAL HEARING LOSS), ASYMMETRICAL: Primary | ICD-10-CM

## 2019-07-26 DIAGNOSIS — H81.01 ENDOLYMPHATIC HYDROPS OF RIGHT EAR: ICD-10-CM

## 2019-07-26 DIAGNOSIS — H91.21 SUDDEN RIGHT HEARING LOSS: Primary | ICD-10-CM

## 2019-07-26 PROCEDURE — 99213 OFFICE O/P EST LOW 20 MIN: CPT | Performed by: OTOLARYNGOLOGY

## 2019-07-26 RX ORDER — MECLIZINE HYDROCHLORIDE 25 MG/1
25 TABLET ORAL 3 TIMES DAILY PRN
COMMUNITY
End: 2019-07-29 | Stop reason: SDUPTHER

## 2019-07-26 RX ORDER — TRIAMTERENE AND HYDROCHLOROTHIAZIDE 37.5; 25 MG/1; MG/1
TABLET ORAL
Refills: 3 | COMMUNITY
Start: 2019-05-31 | End: 2019-07-29 | Stop reason: SDUPTHER

## 2019-07-26 RX ORDER — GLYCOPYRROLATE 1 MG/1
1 TABLET ORAL 3 TIMES DAILY PRN
Qty: 60 TABLET | Refills: 3 | Status: SHIPPED | OUTPATIENT
Start: 2019-07-26 | End: 2019-08-25

## 2019-07-26 RX ORDER — DIAZEPAM 10 MG/1
10 TABLET ORAL 2 TIMES DAILY PRN
COMMUNITY
End: 2019-07-29 | Stop reason: SDUPTHER

## 2019-07-26 NOTE — PROGRESS NOTES
Jodi Bello RN   Patient Intake Note    Review of Systems  Review of Systems   Constitutional: Negative for chills and fever.   HENT:        See HPI   Respiratory: Negative for cough and choking.    Gastrointestinal: Negative for diarrhea, nausea and vomiting.   Musculoskeletal: Negative for gait problem, neck pain and neck stiffness.   Neurological: Positive for dizziness. Negative for light-headedness and headaches.   Hematological: Does not bruise/bleed easily.   Psychiatric/Behavioral: Negative for sleep disturbance.   All other systems reviewed and are negative.      QUALITY MEASURES    Tobacco Use: Screening and Cessation Intervention  Social History    Tobacco Use      Smoking status: Former Smoker        Types: Cigarettes      Smokeless tobacco: Never Used        Jodi Bello RN  7/26/2019  10:12 AM

## 2019-07-26 NOTE — PATIENT INSTRUCTIONS
[facility] Exercises    Exercises for the management of vertigo  These exercises are designed to stimulate or “work” the vestibular system and eventually lessen vertigo during daily activities.  Each exercise is to be done at least twice a day beginning with 5 repetitions each and increasing to 10 repetitions, if you are able.    A.  Head and eye movements while sittin. Keeping head still, look up and then down.  2. Keeping head still, look side to side.  3. Hold a finger out at arm’s length.  Focus on your finger and bring it toward your nose, then back again.  4. Move your head slowly side to side with your eyes open.  5. Move your head quickly side to side.  6. Move your head slowly up and down with your eyes closed.  7. Move your head quickly up and down.  8. Repeat numbers 4 through 7 with your eyes closed.    B.  Head and body movements while sittin. Place an object on the floor in front of you.  Reach down to pick it up, then return to an upright position.  Remember to look down at the object, then look back up when you bring your trunk back up.  2. Bend forward and pass the object back and forth under your knees.    C.  1.   Go from a sitting to standing position, then return to sitting.  2. Repeat this with eyes closed.  3.   Repeat number 1, but turn a full [place] while standing before sitting down.    Other Activities to improve balance:  1. Walk up and down stairs carefully with your eyes open, then closed.  Hold onto a handrail for safety, if needed.  2. While standing, practice making sudden 90 degree turns first with the eyes open, then closed.  3. While walking, look side to side.  This is best done in a grocery store;  read labels as you walk down the isle.  4. Practice standing on one foot; first with the eyes open, then closed.  5. Standing on a soft surface, such as a egg crate mattress, pillow or foam mat:  a. First walk across the surface to get used to it.  b. If you have  room walk heel to toe with the eyes open, then closed.  c. Practice standing on one foot with the eyes open, then closed.

## 2019-07-29 RX ORDER — TRIAMTERENE AND HYDROCHLOROTHIAZIDE 37.5; 25 MG/1; MG/1
1 TABLET ORAL DAILY
Qty: 30 TABLET | Refills: 3 | Status: SHIPPED | OUTPATIENT
Start: 2019-07-29 | End: 2019-08-28

## 2019-07-29 RX ORDER — DIAZEPAM 10 MG/1
10 TABLET ORAL 2 TIMES DAILY PRN
Qty: 60 TABLET | Refills: 0 | Status: SHIPPED | OUTPATIENT
Start: 2019-07-29 | End: 2019-08-28

## 2019-07-29 RX ORDER — MECLIZINE HYDROCHLORIDE 25 MG/1
25 TABLET ORAL 3 TIMES DAILY PRN
Qty: 90 TABLET | Refills: 3 | Status: SHIPPED | OUTPATIENT
Start: 2019-07-29 | End: 2019-08-28

## 2020-08-26 ENCOUNTER — LAB (OUTPATIENT)
Dept: LAB | Facility: HOSPITAL | Age: 70
End: 2020-08-26

## 2020-08-26 ENCOUNTER — TRANSCRIBE ORDERS (OUTPATIENT)
Dept: ADMINISTRATIVE | Facility: HOSPITAL | Age: 70
End: 2020-08-26

## 2020-08-26 DIAGNOSIS — E55.9 VITAMIN D DEFICIENCY: ICD-10-CM

## 2020-08-26 DIAGNOSIS — Z12.5 ENCOUNTER FOR SCREENING FOR MALIGNANT NEOPLASM OF PROSTATE: ICD-10-CM

## 2020-08-26 DIAGNOSIS — Z00.00 ENCOUNTER FOR GENERAL ADULT MEDICAL EXAMINATION W/O ABNORMAL FINDINGS: ICD-10-CM

## 2020-08-26 DIAGNOSIS — Z00.00 ENCOUNTER FOR GENERAL ADULT MEDICAL EXAMINATION W/O ABNORMAL FINDINGS: Primary | ICD-10-CM

## 2020-08-26 LAB
ALBUMIN SERPL-MCNC: 4 G/DL (ref 3.5–5)
ALBUMIN/GLOB SERPL: 1.2 G/DL (ref 1.1–2.5)
ALP SERPL-CCNC: 100 U/L (ref 24–120)
ALT SERPL W P-5'-P-CCNC: 22 U/L (ref 0–50)
ANION GAP SERPL CALCULATED.3IONS-SCNC: 5 MMOL/L (ref 4–13)
AST SERPL-CCNC: 28 U/L (ref 7–45)
AUTO MIXED CELLS #: 0.5 10*3/MM3 (ref 0.1–2.6)
AUTO MIXED CELLS %: 6.3 % (ref 0.1–24)
BILIRUB SERPL-MCNC: 0.7 MG/DL (ref 0.1–1)
BILIRUB UR QL STRIP: NEGATIVE
BUN SERPL-MCNC: 11 MG/DL (ref 5–21)
BUN/CREAT SERPL: 10.7
CALCIUM SPEC-SCNC: 9.3 MG/DL (ref 8.4–10.4)
CHLORIDE SERPL-SCNC: 107 MMOL/L (ref 98–110)
CHOLEST SERPL-MCNC: 243 MG/DL (ref 130–200)
CLARITY UR: CLEAR
CO2 SERPL-SCNC: 29 MMOL/L (ref 24–31)
COLOR UR: YELLOW
CREAT SERPL-MCNC: 1.03 MG/DL (ref 0.5–1.4)
ERYTHROCYTE [DISTWIDTH] IN BLOOD BY AUTOMATED COUNT: 12.9 % (ref 12.3–15.4)
GFR SERPL CREATININE-BSD FRML MDRD: 72 ML/MIN/1.73
GLOBULIN UR ELPH-MCNC: 3.4 GM/DL
GLUCOSE SERPL-MCNC: 105 MG/DL (ref 70–100)
GLUCOSE UR STRIP-MCNC: NEGATIVE MG/DL
HCT VFR BLD AUTO: 46.5 % (ref 37.5–51)
HDLC SERPL-MCNC: 60 MG/DL
HGB BLD-MCNC: 15.6 G/DL (ref 13–17.7)
HGB UR QL STRIP.AUTO: NEGATIVE
KETONES UR QL STRIP: NEGATIVE
LDLC SERPL CALC-MCNC: 159 MG/DL (ref 0–99)
LDLC/HDLC SERPL: 2.65 {RATIO}
LEUKOCYTE ESTERASE UR QL STRIP.AUTO: NEGATIVE
LYMPHOCYTES # BLD AUTO: 3 10*3/MM3 (ref 0.7–3.1)
LYMPHOCYTES NFR BLD AUTO: 34.4 % (ref 19.6–45.3)
MCH RBC QN AUTO: 32 PG (ref 26.6–33)
MCHC RBC AUTO-ENTMCNC: 33.5 G/DL (ref 31.5–35.7)
MCV RBC AUTO: 95.3 FL (ref 79–97)
NEUTROPHILS NFR BLD AUTO: 5.2 10*3/MM3 (ref 1.7–7)
NEUTROPHILS NFR BLD AUTO: 59.3 % (ref 42.7–76)
NITRITE UR QL STRIP: NEGATIVE
PH UR STRIP.AUTO: 7 [PH] (ref 5–8)
PLATELET # BLD AUTO: 171 10*3/MM3 (ref 140–450)
PMV BLD AUTO: 9.6 FL (ref 6–12)
POTASSIUM SERPL-SCNC: 4.9 MMOL/L (ref 3.5–5.3)
PROT SERPL-MCNC: 7.4 G/DL (ref 6.3–8.7)
PROT UR QL STRIP: NEGATIVE
RBC # BLD AUTO: 4.88 10*6/MM3 (ref 4.14–5.8)
SODIUM SERPL-SCNC: 141 MMOL/L (ref 135–145)
SP GR UR STRIP: 1.01 (ref 1–1.03)
TRIGL SERPL-MCNC: 119 MG/DL (ref 0–149)
UROBILINOGEN UR QL STRIP: NORMAL
VLDLC SERPL-MCNC: 23.8 MG/DL
WBC # BLD AUTO: 8.7 10*3/MM3 (ref 3.4–10.8)

## 2020-08-26 PROCEDURE — 82306 VITAMIN D 25 HYDROXY: CPT | Performed by: PEDIATRICS

## 2020-08-26 PROCEDURE — 80061 LIPID PANEL: CPT

## 2020-08-26 PROCEDURE — 81003 URINALYSIS AUTO W/O SCOPE: CPT

## 2020-08-26 PROCEDURE — 80053 COMPREHEN METABOLIC PANEL: CPT | Performed by: PEDIATRICS

## 2020-08-26 PROCEDURE — 84443 ASSAY THYROID STIM HORMONE: CPT | Performed by: PEDIATRICS

## 2020-08-26 PROCEDURE — 85025 COMPLETE CBC W/AUTO DIFF WBC: CPT

## 2020-08-26 PROCEDURE — G0103 PSA SCREENING: HCPCS | Performed by: PEDIATRICS

## 2020-08-26 PROCEDURE — 36415 COLL VENOUS BLD VENIPUNCTURE: CPT | Performed by: PEDIATRICS

## 2020-08-27 LAB
25(OH)D3 SERPL-MCNC: 50.6 NG/ML (ref 30–100)
PSA SERPL-MCNC: 1.84 NG/ML (ref 0–4)
TSH SERPL DL<=0.05 MIU/L-ACNC: 0.69 UIU/ML (ref 0.27–4.2)

## 2021-02-04 ENCOUNTER — OFFICE VISIT (OUTPATIENT)
Dept: OTOLARYNGOLOGY | Facility: CLINIC | Age: 71
End: 2021-02-04

## 2021-02-04 VITALS
TEMPERATURE: 97.3 F | WEIGHT: 164.6 LBS | HEART RATE: 93 BPM | SYSTOLIC BLOOD PRESSURE: 156 MMHG | DIASTOLIC BLOOD PRESSURE: 93 MMHG | BODY MASS INDEX: 23.62 KG/M2

## 2021-02-04 DIAGNOSIS — H81.01 MENIERE'S DISEASE OF RIGHT EAR: Primary | ICD-10-CM

## 2021-02-04 PROCEDURE — 99214 OFFICE O/P EST MOD 30 MIN: CPT | Performed by: OTOLARYNGOLOGY

## 2021-02-04 RX ORDER — TRIAMTERENE AND HYDROCHLOROTHIAZIDE 37.5; 25 MG/1; MG/1
1 CAPSULE ORAL EVERY MORNING
Qty: 30 CAPSULE | Refills: 3 | Status: SHIPPED | OUTPATIENT
Start: 2021-02-04

## 2021-02-04 RX ORDER — DIAZEPAM 10 MG/1
10 TABLET ORAL 2 TIMES DAILY PRN
Qty: 50 TABLET | Refills: 0 | Status: SHIPPED | OUTPATIENT
Start: 2021-02-04

## 2021-02-04 RX ORDER — FUROSEMIDE 40 MG/1
40 TABLET ORAL 2 TIMES DAILY
COMMUNITY
End: 2021-02-04 | Stop reason: ALTCHOICE

## 2021-02-04 RX ORDER — TRIAMTERENE AND HYDROCHLOROTHIAZIDE 37.5; 25 MG/1; MG/1
1 CAPSULE ORAL EVERY MORNING
Qty: 30 CAPSULE | Refills: 3 | Status: SHIPPED | OUTPATIENT
Start: 2021-02-04 | End: 2021-02-04 | Stop reason: SDUPTHER

## 2021-02-04 RX ORDER — DIAZEPAM 10 MG/1
10 TABLET ORAL 2 TIMES DAILY PRN
COMMUNITY
End: 2021-02-04 | Stop reason: SDUPTHER

## 2021-02-04 RX ORDER — GLYCOPYRROLATE 1 MG/1
1 TABLET ORAL 3 TIMES DAILY PRN
Qty: 60 TABLET | Refills: 3 | Status: SHIPPED | OUTPATIENT
Start: 2021-02-04

## 2021-02-04 RX ORDER — ERGOCALCIFEROL (VITAMIN D2) 10 MCG
400 TABLET ORAL DAILY
COMMUNITY

## 2021-02-04 RX ORDER — PREDNISONE 10 MG/1
TABLET ORAL
Qty: 42 TABLET | Refills: 0 | Status: SHIPPED | OUTPATIENT
Start: 2021-02-04

## 2021-02-04 RX ORDER — MECLIZINE HCL 25MG 25 MG/1
25 TABLET, CHEWABLE ORAL 3 TIMES DAILY PRN
COMMUNITY

## 2021-02-04 NOTE — PROGRESS NOTES
Hector Cyr MD     Chief Complaint   Patient presents with   • Dizziness          HPI  Julio Abdullahi is a  70 y.o. male who is here for follow up.  He has had ongoing issues with Ménière's disease.  He continues to have a roaring tinnitus and fullness in the right ear.  He has had hearing loss in that ear that seems to be rather stable.  He does have intermittent vertigo but gains relief using Antivert and Valium.  He has had a strong history of allergic rhinitis in the past and was on immunotherapy without significant relief.        Vital Signs:   Temp:  [97.3 °F (36.3 °C)] 97.3 °F (36.3 °C)  Heart Rate:  [93] 93  BP: (156)/(93) 156/93    Physical Exam  Vitals signs reviewed.   Constitutional:       Appearance: Normal appearance. He is well-developed.   HENT:      Head: Normocephalic and atraumatic.      Right Ear: External ear normal.      Left Ear: External ear normal.      Nose: Nose normal.   Eyes:      Extraocular Movements: Extraocular movements intact.      Conjunctiva/sclera: Conjunctivae normal.   Neck:      Musculoskeletal: Normal range of motion.   Pulmonary:      Effort: Pulmonary effort is normal. No respiratory distress.      Breath sounds: No stridor.   Musculoskeletal: Normal range of motion.   Skin:     Findings: No rash.   Neurological:      General: No focal deficit present.      Mental Status: He is alert.      Cranial Nerves: No cranial nerve deficit.   Psychiatric:         Behavior: Behavior normal.         Thought Content: Thought content normal.         Judgment: Judgment normal.                  Assessment/Plan    Assessment:   1. Meniere's disease of right ear        Plan:   We had a long discussion about options including masking devices and hearing aid amplification.  At this point time would like to try another steroid taper to see if it has any effect on his tinnitus.  He is about to go down to [place] and we gave him medication refills to take.  As far as his allergy  disease, I feel that he could consider immunotherapy to see if it helped his Ménière's disease and he will consider this.          New Medications Ordered This Visit   Medications   • glycopyrrolate (ROBINUL) 1 MG tablet     Sig: Take 1 tablet by mouth 3 (Three) Times a Day As Needed (vertigo). Take one pill three times a day as needed for vertigo     Dispense:  60 tablet     Refill:  3   • predniSONE (DELTASONE) 10 MG tablet     Si tabs daily x 2 days, 5 tabs daily x 2 days, 4 tabs x 2 days, 3 tablets x 2 days, 2 tablets x 2 days, and 1 tab x 2 days.     Dispense:  42 tablet     Refill:  0   • triamterene-hydrochlorothiazide (Dyazide) 37.5-25 MG per capsule     Sig: Take 1 capsule by mouth Every Morning.     Dispense:  30 capsule     Refill:  3   • diazePAM (VALIUM) 10 MG tablet     Sig: Take 1 tablet by mouth 2 (Two) Times a Day As Needed for Anxiety.     Dispense:  50 tablet     Refill:  0   Continue low-salt diet.  Discontinue Lasix and try Dyazide              Hector Cyr MD  21  18:12 CST

## 2021-07-21 PROCEDURE — 88305 TISSUE EXAM BY PATHOLOGIST: CPT | Performed by: SPECIALIST

## 2021-07-22 ENCOUNTER — LAB REQUISITION (OUTPATIENT)
Dept: LAB | Facility: HOSPITAL | Age: 71
End: 2021-07-22

## 2021-07-22 ENCOUNTER — TRANSCRIBE ORDERS (OUTPATIENT)
Dept: ADMINISTRATIVE | Facility: HOSPITAL | Age: 71
End: 2021-07-22

## 2021-07-22 DIAGNOSIS — Z00.00 ENCOUNTER FOR GENERAL ADULT MEDICAL EXAMINATION WITHOUT ABNORMAL FINDINGS: ICD-10-CM

## 2021-07-22 DIAGNOSIS — Z85.820 HISTORY OF MELANOMA: Primary | ICD-10-CM

## 2021-07-26 LAB
CYTO UR: NORMAL
LAB AP CASE REPORT: NORMAL
LAB AP CLINICAL INFORMATION: NORMAL
PATH REPORT.FINAL DX SPEC: NORMAL
PATH REPORT.GROSS SPEC: NORMAL

## 2021-07-28 ENCOUNTER — HOSPITAL ENCOUNTER (OUTPATIENT)
Dept: CT IMAGING | Facility: HOSPITAL | Age: 71
Discharge: HOME | End: 2021-07-28

## 2021-07-28 ENCOUNTER — TRANSCRIBE ORDERS (OUTPATIENT)
Dept: ADMINISTRATIVE | Facility: HOSPITAL | Age: 71
End: 2021-07-28

## 2021-07-28 ENCOUNTER — LAB (OUTPATIENT)
Dept: LAB | Facility: HOSPITAL | Age: 71
End: 2021-07-28

## 2021-07-28 DIAGNOSIS — Z85.820 HISTORY OF MELANOMA: ICD-10-CM

## 2021-07-28 DIAGNOSIS — Z85.820 HISTORY OF MELANOMA: Primary | ICD-10-CM

## 2021-07-28 LAB
25(OH)D3 SERPL-MCNC: 59.5 NG/ML
ALBUMIN SERPL-MCNC: 3.9 G/DL (ref 3.5–5)
ALBUMIN/GLOB SERPL: 1.1 G/DL (ref 1.1–2.5)
ALP SERPL-CCNC: 94 U/L (ref 24–120)
ALT SERPL W P-5'-P-CCNC: 18 U/L (ref 0–50)
ANION GAP SERPL CALCULATED.3IONS-SCNC: 5 MMOL/L (ref 4–13)
AST SERPL-CCNC: 31 U/L (ref 7–45)
AUTO MIXED CELLS #: 0.6 10*3/MM3 (ref 0.1–2.6)
AUTO MIXED CELLS %: 6.2 % (ref 0.1–24)
BILIRUB SERPL-MCNC: 0.5 MG/DL (ref 0.1–1)
BILIRUB UR QL STRIP: NEGATIVE
BUN SERPL-MCNC: 8 MG/DL (ref 5–21)
BUN/CREAT SERPL: 7.9
CALCIUM SPEC-SCNC: 9.2 MG/DL (ref 8.4–10.4)
CHLORIDE SERPL-SCNC: 108 MMOL/L (ref 98–110)
CHOLEST SERPL-MCNC: 210 MG/DL (ref 130–200)
CLARITY UR: CLEAR
CO2 SERPL-SCNC: 30 MMOL/L (ref 24–31)
COLOR UR: YELLOW
CREAT SERPL-MCNC: 1.01 MG/DL (ref 0.5–1.4)
ERYTHROCYTE [DISTWIDTH] IN BLOOD BY AUTOMATED COUNT: 13.8 % (ref 12.3–15.4)
GFR SERPL CREATININE-BSD FRML MDRD: 73 ML/MIN/1.73
GLOBULIN UR ELPH-MCNC: 3.4 GM/DL
GLUCOSE SERPL-MCNC: 127 MG/DL (ref 70–100)
GLUCOSE UR STRIP-MCNC: NEGATIVE MG/DL
HBA1C MFR BLD: 5.5 % (ref 4.8–5.9)
HCT VFR BLD AUTO: 47.7 % (ref 37.5–51)
HDLC SERPL-MCNC: 70 MG/DL
HGB BLD-MCNC: 15.8 G/DL (ref 13–17.7)
HGB UR QL STRIP.AUTO: NEGATIVE
KETONES UR QL STRIP: NEGATIVE
LDLC SERPL CALC-MCNC: 126 MG/DL (ref 0–99)
LDLC/HDLC SERPL: 1.77 {RATIO}
LEUKOCYTE ESTERASE UR QL STRIP.AUTO: NEGATIVE
LYMPHOCYTES # BLD AUTO: 3.2 10*3/MM3 (ref 0.7–3.1)
LYMPHOCYTES NFR BLD AUTO: 33.3 % (ref 19.6–45.3)
MCH RBC QN AUTO: 32.6 PG (ref 26.6–33)
MCHC RBC AUTO-ENTMCNC: 33.1 G/DL (ref 31.5–35.7)
MCV RBC AUTO: 98.4 FL (ref 79–97)
NEUTROPHILS NFR BLD AUTO: 5.9 10*3/MM3 (ref 1.7–7)
NEUTROPHILS NFR BLD AUTO: 60.5 % (ref 42.7–76)
NITRITE UR QL STRIP: NEGATIVE
PH UR STRIP.AUTO: 8 [PH] (ref 5–8)
PLATELET # BLD AUTO: 199 10*3/MM3 (ref 140–450)
PMV BLD AUTO: 9.8 FL (ref 6–12)
POTASSIUM SERPL-SCNC: 4.7 MMOL/L (ref 3.5–5.3)
PROT SERPL-MCNC: 7.3 G/DL (ref 6.3–8.7)
PROT UR QL STRIP: NEGATIVE
PSA SERPL-MCNC: 3.17 NG/ML (ref 0–4)
RBC # BLD AUTO: 4.85 10*6/MM3 (ref 4.14–5.8)
SODIUM SERPL-SCNC: 143 MMOL/L (ref 135–145)
SP GR UR STRIP: 1.01 (ref 1–1.03)
TRIGL SERPL-MCNC: 80 MG/DL (ref 0–149)
TSH SERPL DL<=0.05 MIU/L-ACNC: 0.7 UIU/ML (ref 0.27–4.2)
UROBILINOGEN UR QL STRIP: NORMAL
VLDLC SERPL-MCNC: 14 MG/DL (ref 5–40)
WBC # BLD AUTO: 9.7 10*3/MM3 (ref 3.4–10.8)

## 2021-07-28 PROCEDURE — 36415 COLL VENOUS BLD VENIPUNCTURE: CPT | Performed by: SPECIALIST

## 2021-07-28 PROCEDURE — 83036 HEMOGLOBIN GLYCOSYLATED A1C: CPT | Performed by: SPECIALIST

## 2021-07-28 PROCEDURE — 74176 CT ABD & PELVIS W/O CONTRAST: CPT

## 2021-07-28 PROCEDURE — G0103 PSA SCREENING: HCPCS

## 2021-07-28 PROCEDURE — 70450 CT HEAD/BRAIN W/O DYE: CPT

## 2021-07-28 PROCEDURE — 82306 VITAMIN D 25 HYDROXY: CPT

## 2021-07-28 PROCEDURE — 71250 CT THORAX DX C-: CPT

## 2021-07-28 PROCEDURE — 85025 COMPLETE CBC W/AUTO DIFF WBC: CPT

## 2021-07-28 PROCEDURE — 70490 CT SOFT TISSUE NECK W/O DYE: CPT

## 2021-07-28 PROCEDURE — 80061 LIPID PANEL: CPT

## 2021-07-28 PROCEDURE — 81003 URINALYSIS AUTO W/O SCOPE: CPT | Performed by: SPECIALIST

## 2021-07-28 PROCEDURE — 80053 COMPREHEN METABOLIC PANEL: CPT | Performed by: SPECIALIST

## 2021-07-28 PROCEDURE — 84443 ASSAY THYROID STIM HORMONE: CPT

## 2021-07-30 ENCOUNTER — APPOINTMENT (OUTPATIENT)
Dept: CT IMAGING | Facility: HOSPITAL | Age: 71
End: 2021-07-30

## 2022-05-25 DIAGNOSIS — R42 VERTIGO: Primary | ICD-10-CM

## 2022-05-25 RX ORDER — DIAZEPAM 10 MG/1
10 TABLET ORAL 2 TIMES DAILY PRN
Qty: 100 TABLET | Refills: 0 | Status: SHIPPED | OUTPATIENT
Start: 2022-05-25

## 2022-05-25 RX ORDER — BUTALBITAL, ASPIRIN, AND CAFFEINE 325; 50; 40 MG/1; MG/1; MG/1
1 CAPSULE ORAL EVERY 4 HOURS PRN
Qty: 50 CAPSULE | Refills: 2 | Status: SHIPPED | OUTPATIENT
Start: 2022-05-25

## 2022-08-12 DIAGNOSIS — F17.200 SMOKER: Primary | ICD-10-CM
